# Patient Record
Sex: MALE | Race: BLACK OR AFRICAN AMERICAN | Employment: FULL TIME
[De-identification: names, ages, dates, MRNs, and addresses within clinical notes are randomized per-mention and may not be internally consistent; named-entity substitution may affect disease eponyms.]

---

## 2019-12-08 ENCOUNTER — HEALTH MAINTENANCE LETTER (OUTPATIENT)
Age: 39
End: 2019-12-08

## 2020-09-01 ENCOUNTER — TELEPHONE (OUTPATIENT)
Dept: FAMILY MEDICINE | Facility: CLINIC | Age: 40
End: 2020-09-01

## 2020-09-02 ENCOUNTER — OFFICE VISIT (OUTPATIENT)
Dept: FAMILY MEDICINE | Facility: CLINIC | Age: 40
End: 2020-09-02

## 2020-09-02 VITALS
HEIGHT: 73 IN | OXYGEN SATURATION: 100 % | HEART RATE: 93 BPM | WEIGHT: 226 LBS | TEMPERATURE: 97.1 F | BODY MASS INDEX: 29.95 KG/M2 | DIASTOLIC BLOOD PRESSURE: 86 MMHG | SYSTOLIC BLOOD PRESSURE: 134 MMHG

## 2020-09-02 DIAGNOSIS — F41.8 SITUATIONAL ANXIETY: ICD-10-CM

## 2020-09-02 DIAGNOSIS — F43.21 GRIEF REACTION: Primary | ICD-10-CM

## 2020-09-02 PROCEDURE — 99202 OFFICE O/P NEW SF 15 MIN: CPT | Performed by: PHYSICIAN ASSISTANT

## 2020-09-02 RX ORDER — HYDROXYZINE HYDROCHLORIDE 25 MG/1
25 TABLET, FILM COATED ORAL 3 TIMES DAILY PRN
Qty: 30 TABLET | Refills: 0 | Status: SHIPPED | OUTPATIENT
Start: 2020-09-02

## 2020-09-02 SDOH — HEALTH STABILITY: MENTAL HEALTH: HOW OFTEN DO YOU HAVE 6 OR MORE DRINKS ON ONE OCCASION?: LESS THAN MONTHLY

## 2020-09-02 SDOH — HEALTH STABILITY: MENTAL HEALTH: HOW MANY STANDARD DRINKS CONTAINING ALCOHOL DO YOU HAVE ON A TYPICAL DAY?: 1 OR 2

## 2020-09-02 SDOH — HEALTH STABILITY: MENTAL HEALTH: HOW OFTEN DO YOU HAVE A DRINK CONTAINING ALCOHOL?: 2-4 TIMES A MONTH

## 2020-09-02 ASSESSMENT — MIFFLIN-ST. JEOR: SCORE: 1994.01

## 2020-09-02 NOTE — NURSING NOTE
Collin is here for [facility] Short Disability Forms.        Pre-visit Screening:  Immunizations:  up to date  Colonoscopy:  NA  Mammogram: NA  Asthma Action Test/Plan:  NA  PHQ9:  PHQ2 done  GAD7:  -  Questioned patient about current smoking habits Pt. occasional smoker  Ok to leave detailed message on voice mail for today's visit only Yes, phone # [phone]

## 2020-09-02 NOTE — LETTER
[facility]  [address] [place]  [address]  [facility] [place] [address]  [phone]      September 2, 2020      Collin Adorno  [address] [facility] [address]      EMERGENCY CARE PLAN  Presenting Problem Treatment Plan   Questions or concerns during clinic hours I will call the clinic directly:    [facility]  [address] , [address]  [place] [address]  [phone]   Questions or concerns outside clinic hours  I will call the 24 hour line at [phone]   Patient needs to schedule an appointment  I will call the  scheduling line at [phone]   Same day treatment   I will call the clinic first, then  urgent care and/or  express care if needed   Clinic Care Coordinators Karen Osborne RN:  [phone]  [facility] Clinical Support Staff: [phone]    Crisis Services:  Behavioral or Mental Health [facility] ([facility])   [phone]   Emergency treatment--Immediately CALL [phone]

## 2020-09-02 NOTE — PROGRESS NOTES
"CC: Disability paperwork    History:  Collin is here today to get disability forms completed. Had brother commit suicide 8/12/2020, which has understandably lead to significant grief reaction.     Has not met with grief counselor yet, but not interested in this just yet. Has been finding himself tearful, and stressed with contact worries and replaying situation over and over. Denies any panic. Collin does not have any history of mental health diagnoses himself.      Works for [employer] as a . [place] [employer] to facilities. Has taken some vacation days since 8/12/2020, so is needing paid leave with short term disability.     PMH, MEDICATIONS, ALLERGIES, SOCIAL AND FAMILY HISTORY in [facility] and reviewed by me personally    ROS negative other than the symptoms noted above in the HPI.    Examination   /86 (BP Location: Left arm, Patient Position: Sitting, Cuff Size: Adult Large)   Pulse 93   Temp 97.1  F (36.2  C) (Oral)   Ht 1.854 m (6' 1\")   Wt 102.5 kg (226 lb)   SpO2 100%   BMI 29.82 kg/m       Constitutional: Sitting comfortably, in no acute distress. Vital signs noted  Eyes: pupils equal round reactive to light and accomodation, extra ocular movements intact  Neck:  no adenopathy, trachea midline and normal to palpation, thyroid normal to palpation  Cardiovascular:  regular rate and rhythm, no murmurs, clicks, or gallops  Respiratory:  normal respiratory rate and rhythm, lungs clear to auscultation  SKIN: No jaundice/pallor/rash.   Psychiatric: mentation appears normal and affect normal/bright        A/P    ICD-10-CM    1. Grief reaction  F43.21 hydrOXYzine (ATARAX) 25 MG tablet   2. Situational anxiety  F41.8 hydrOXYzine (ATARAX) 25 MG tablet       DISCUSSION:  Collin' symptoms are consistent with acute grief reaction and situational anxiety. Encouraged him to meet with a grief counselor, but he denies referral for this at this time. Agreed to complete paperwork for short term " disability with goal return date of 10/19/2020. Will fax this and scan copy into chart. Discussed daily medication versus as needed medication. He is hesitant to do daily medication, but agrees to trial of hydroxyzine, which he can use as needed. Warned of side effects, especially dizziness, drowsiness.     follow up visit: 1.5 months, prior to return to work 10/19/2020.     Latrice Burr PA-C  [facility]

## 2020-09-24 ENCOUNTER — TELEPHONE (OUTPATIENT)
Dept: FAMILY MEDICINE | Facility: CLINIC | Age: 40
End: 2020-09-24

## 2020-09-24 NOTE — TELEPHONE ENCOUNTER
Pt called saying the form you filled out did not have enough information, he said it needs to have a when to return to work and a therapy plan.

## 2020-09-24 NOTE — TELEPHONE ENCOUNTER
Called and spoke to Collin. I would be willing to addend note further, or redo form, but I feel like I need more guidance. Collin will look into this, and get back to me with specifics.

## 2020-09-29 NOTE — TELEPHONE ENCOUNTER
Collin called back. They left him a message saying it needs  To state the upcoming appt and what may be discussed at that appt about starting a new medication and the plan of seeing a consoler/ therapist, and if theres a chance that the back to work date could change.

## 2020-10-12 ENCOUNTER — OFFICE VISIT (OUTPATIENT)
Dept: FAMILY MEDICINE | Facility: CLINIC | Age: 40
End: 2020-10-12

## 2020-10-12 VITALS
BODY MASS INDEX: 31.41 KG/M2 | DIASTOLIC BLOOD PRESSURE: 84 MMHG | WEIGHT: 237 LBS | HEART RATE: 64 BPM | SYSTOLIC BLOOD PRESSURE: 128 MMHG | OXYGEN SATURATION: 96 % | HEIGHT: 73 IN | TEMPERATURE: 98.1 F

## 2020-10-12 DIAGNOSIS — F43.21 SITUATIONAL DEPRESSION: Primary | ICD-10-CM

## 2020-10-12 DIAGNOSIS — F41.8 SITUATIONAL ANXIETY: ICD-10-CM

## 2020-10-12 DIAGNOSIS — F43.21 GRIEF REACTION: ICD-10-CM

## 2020-10-12 PROCEDURE — 99213 OFFICE O/P EST LOW 20 MIN: CPT | Performed by: PHYSICIAN ASSISTANT

## 2020-10-12 ASSESSMENT — ANXIETY QUESTIONNAIRES
3. WORRYING TOO MUCH ABOUT DIFFERENT THINGS: NEARLY EVERY DAY
IF YOU CHECKED OFF ANY PROBLEMS ON THIS QUESTIONNAIRE, HOW DIFFICULT HAVE THESE PROBLEMS MADE IT FOR YOU TO DO YOUR WORK, TAKE CARE OF THINGS AT HOME, OR GET ALONG WITH OTHER PEOPLE: EXTREMELY DIFFICULT
7. FEELING AFRAID AS IF SOMETHING AWFUL MIGHT HAPPEN: NEARLY EVERY DAY
6. BECOMING EASILY ANNOYED OR IRRITABLE: MORE THAN HALF THE DAYS
1. FEELING NERVOUS, ANXIOUS, OR ON EDGE: NEARLY EVERY DAY
5. BEING SO RESTLESS THAT IT IS HARD TO SIT STILL: NEARLY EVERY DAY
2. NOT BEING ABLE TO STOP OR CONTROL WORRYING: NEARLY EVERY DAY
GAD7 TOTAL SCORE: 20

## 2020-10-12 ASSESSMENT — MIFFLIN-ST. JEOR: SCORE: 2043.9

## 2020-10-12 ASSESSMENT — PATIENT HEALTH QUESTIONNAIRE - PHQ9
SUM OF ALL RESPONSES TO PHQ QUESTIONS 1-9: 9
5. POOR APPETITE OR OVEREATING: NEARLY EVERY DAY

## 2020-10-12 NOTE — PROGRESS NOTES
"CC: Situational anxiety, depression, grieving    History:  Collin was here 9/2/2020 needing disability paperwork for acute grief reaction for his brother's suicide 8/12/2020. Since that time, he has had improvement with tearfulness. However, he can tell he is still self-isolating, anxious and worrying about different things, which wasn't an issue prior to this loss. Had been prescribed hydroxyzine 25 mg tablets to take as needed for anxiety. Did have a couple instances where he could tell he was more anxious, so he tried this, but didn't feel any effect. He does not feel that he is getting the point of panic attacks.     At this point he would be willing to consider a medication and therapy.     PMH, MEDICATIONS, ALLERGIES, SOCIAL AND FAMILY HISTORY in [facility] and reviewed by me personally.    ROS negative other than the symptoms noted above in the HPI.    Examination   /84   Pulse 64   Temp 98.1  F (36.7  C) (Oral)   Ht 1.854 m (6' 1\")   Wt 107.5 kg (237 lb)   SpO2 96%   BMI 31.27 kg/m       Constitutional: Sitting comfortably, in no acute distress. Vital signs noted  Neck:  no adenopathy, trachea midline and normal to palpation, thyroid normal to palpation  Cardiovascular:  regular rate and rhythm, no murmurs, clicks, or gallops  Respiratory:  normal respiratory rate and rhythm, lungs clear to auscultation  SKIN: No jaundice/pallor/rash.   Psychiatric: mentation appears normal and affect normal/bright        A/P    ICD-10-CM    1. Situational depression  F43.21 sertraline (ZOLOFT) 50 MG tablet   2. Situational anxiety  F41.8 sertraline (ZOLOFT) 50 MG tablet   3. Grief reaction  F43.21 sertraline (ZOLOFT) 50 MG tablet       DISCUSSION:  Justin-7 score of 20, with PHQ-9 score of 9, no SI, HI. I feels that Collin symptoms are consistent with situational anxiety and depression in the setting of grief reaction. Recommended he meet with grief counselor, for which I gave him a couple options to call. Also recommended " trial of sertraline 50 mg daily. Instructed on how to use. Taking 1/2 tablet daily for up to 7 days before increasing to full tablet. Monitor closely for side effects, including upset stomach, diarrhea, mental fog, dizziness, headache, sexual side effects, and to contact me if noted. Warned to avoid alcohol while taking.     Agreed to update form from [employer] to extend his leave an additional with hopeful return to work 11/17/2020. If Collin is still not feeling ready to return to work at that time, may refer to psychiatrist.     follow up visit: 1 month    Latrice Burr PA-C  [facility]

## 2020-10-12 NOTE — LETTER
[facility]  [address], [address]  [place]  [address]    October 12, 2020        Collin Adorno  [address] [facility] [address]              To Whom It May Concern:    Collin is a patient at our clinic, who was seen in follow-up 10/12/2020. He is showing improvement, but unfortunately still experiencing symptoms significant enough where he is not mentally ready to return to work. At this point we are starting a trial of a medication sertraline to help with situational anxiety and depression. Collin also agrees to move forward with meeting with a therapist.    I have updated previous paperwork. New return to work goal is 11/17/2020.     If you have any further questions or problems, please contact our office at [phone], or by fax at [phone].          Latrice Burr PA-C

## 2020-10-12 NOTE — NURSING NOTE
Collin is here to consult on FMLA forms. Pt needs them to be filled out like last year.        Pre-visit Screening:  Immunizations:  Unknown-- will get from [employer]  Colonoscopy:  NA  Mammogram: NA  Asthma Action Test/Plan:  NA  PHQ9:  Done today  GAD7:  Done today  Questioned patient about current smoking habits Pt. occasional smoker  Ok to leave detailed message on voice mail for today's visit only Yes, phone # cell

## 2020-10-13 ASSESSMENT — ANXIETY QUESTIONNAIRES: GAD7 TOTAL SCORE: 20

## 2021-01-09 ENCOUNTER — HEALTH MAINTENANCE LETTER (OUTPATIENT)
Age: 41
End: 2021-01-09

## 2021-10-23 ENCOUNTER — HEALTH MAINTENANCE LETTER (OUTPATIENT)
Age: 41
End: 2021-10-23

## 2022-02-12 ENCOUNTER — HEALTH MAINTENANCE LETTER (OUTPATIENT)
Age: 42
End: 2022-02-12

## 2022-10-09 ENCOUNTER — HEALTH MAINTENANCE LETTER (OUTPATIENT)
Age: 42
End: 2022-10-09